# Patient Record
Sex: MALE | Race: WHITE
[De-identification: names, ages, dates, MRNs, and addresses within clinical notes are randomized per-mention and may not be internally consistent; named-entity substitution may affect disease eponyms.]

---

## 2018-02-14 ENCOUNTER — HOSPITAL ENCOUNTER (EMERGENCY)
Dept: HOSPITAL 17 - NEPC | Age: 58
Discharge: HOME | End: 2018-02-14
Payer: SELF-PAY

## 2018-02-14 VITALS
RESPIRATION RATE: 16 BRPM | HEART RATE: 71 BPM | SYSTOLIC BLOOD PRESSURE: 118 MMHG | DIASTOLIC BLOOD PRESSURE: 66 MMHG | OXYGEN SATURATION: 97 %

## 2018-02-14 VITALS
HEART RATE: 86 BPM | RESPIRATION RATE: 20 BRPM | DIASTOLIC BLOOD PRESSURE: 80 MMHG | OXYGEN SATURATION: 98 % | SYSTOLIC BLOOD PRESSURE: 113 MMHG

## 2018-02-14 VITALS — HEIGHT: 75 IN | BODY MASS INDEX: 37.83 KG/M2 | WEIGHT: 304.24 LBS

## 2018-02-14 VITALS — TEMPERATURE: 97.6 F

## 2018-02-14 DIAGNOSIS — I10: ICD-10-CM
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
## 2018-02-14 DIAGNOSIS — K21.9: ICD-10-CM

## 2018-02-14 DIAGNOSIS — E86.0: ICD-10-CM

## 2018-02-14 DIAGNOSIS — R94.31: ICD-10-CM

## 2018-02-14 DIAGNOSIS — F10.129: ICD-10-CM

## 2018-02-14 DIAGNOSIS — R55: Primary | ICD-10-CM

## 2018-02-14 LAB
ALBUMIN SERPL-MCNC: 3.7 GM/DL (ref 3.4–5)
ALP SERPL-CCNC: 92 U/L (ref 45–117)
ALT SERPL-CCNC: 31 U/L (ref 12–78)
AST SERPL-CCNC: 43 U/L (ref 15–37)
BASOPHILS # BLD AUTO: 0.1 TH/MM3 (ref 0–0.2)
BASOPHILS NFR BLD: 1.1 % (ref 0–2)
BILIRUB SERPL-MCNC: 0.3 MG/DL (ref 0.2–1)
BUN SERPL-MCNC: 31 MG/DL (ref 7–18)
CALCIUM SERPL-MCNC: 8.2 MG/DL (ref 8.5–10.1)
CHLORIDE SERPL-SCNC: 99 MEQ/L (ref 98–107)
COLOR UR: YELLOW
CREAT SERPL-MCNC: 1.72 MG/DL (ref 0.6–1.3)
EOSINOPHIL # BLD: 0.1 TH/MM3 (ref 0–0.4)
EOSINOPHIL NFR BLD: 1 % (ref 0–4)
ERYTHROCYTE [DISTWIDTH] IN BLOOD BY AUTOMATED COUNT: 13.4 % (ref 11.6–17.2)
GFR SERPLBLD BASED ON 1.73 SQ M-ARVRAT: 41 ML/MIN (ref 89–?)
GLUCOSE SERPL-MCNC: 143 MG/DL (ref 74–106)
GLUCOSE UR STRIP-MCNC: (no result) MG/DL
HCO3 BLD-SCNC: 27.1 MEQ/L (ref 21–32)
HCT VFR BLD CALC: 38.5 % (ref 39–51)
HGB BLD-MCNC: 12.9 GM/DL (ref 13–17)
HGB UR QL STRIP: (no result)
HYALINE CASTS #/AREA URNS LPF: 11 /LPF
INR PPP: 1 RATIO
KETONES UR STRIP-MCNC: (no result) MG/DL
LYMPHOCYTES # BLD AUTO: 2.1 TH/MM3 (ref 1–4.8)
LYMPHOCYTES NFR BLD AUTO: 30.7 % (ref 9–44)
MCH RBC QN AUTO: 32.5 PG (ref 27–34)
MCHC RBC AUTO-ENTMCNC: 33.6 % (ref 32–36)
MCV RBC AUTO: 96.5 FL (ref 80–100)
MONOCYTE #: 0.9 TH/MM3 (ref 0–0.9)
MONOCYTES NFR BLD: 13 % (ref 0–8)
MUCOUS THREADS #/AREA URNS LPF: (no result) /LPF
NEUTROPHILS # BLD AUTO: 3.7 TH/MM3 (ref 1.8–7.7)
NEUTROPHILS NFR BLD AUTO: 54.2 % (ref 16–70)
NITRITE UR QL STRIP: (no result)
PLATELET # BLD: 235 TH/MM3 (ref 150–450)
PMV BLD AUTO: 8.3 FL (ref 7–11)
PROT SERPL-MCNC: 7.5 GM/DL (ref 6.4–8.2)
PROTHROMBIN TIME: 10.3 SEC (ref 9.8–11.6)
RBC # BLD AUTO: 3.99 MIL/MM3 (ref 4.5–5.9)
SODIUM SERPL-SCNC: 138 MEQ/L (ref 136–145)
SP GR UR STRIP: 1.02 (ref 1–1.03)
SQUAMOUS #/AREA URNS HPF: <1 /HPF (ref 0–5)
TROPONIN I SERPL-MCNC: (no result) NG/ML (ref 0.02–0.05)
URINE LEUKOCYTE ESTERASE: (no result)
WBC # BLD AUTO: 6.8 TH/MM3 (ref 4–11)

## 2018-02-14 PROCEDURE — 70450 CT HEAD/BRAIN W/O DYE: CPT

## 2018-02-14 PROCEDURE — 83880 ASSAY OF NATRIURETIC PEPTIDE: CPT

## 2018-02-14 PROCEDURE — 80307 DRUG TEST PRSMV CHEM ANLYZR: CPT

## 2018-02-14 PROCEDURE — 85730 THROMBOPLASTIN TIME PARTIAL: CPT

## 2018-02-14 PROCEDURE — 93005 ELECTROCARDIOGRAM TRACING: CPT

## 2018-02-14 PROCEDURE — 85025 COMPLETE CBC W/AUTO DIFF WBC: CPT

## 2018-02-14 PROCEDURE — 80053 COMPREHEN METABOLIC PANEL: CPT

## 2018-02-14 PROCEDURE — 96360 HYDRATION IV INFUSION INIT: CPT

## 2018-02-14 PROCEDURE — 84484 ASSAY OF TROPONIN QUANT: CPT

## 2018-02-14 PROCEDURE — 81001 URINALYSIS AUTO W/SCOPE: CPT

## 2018-02-14 PROCEDURE — 85610 PROTHROMBIN TIME: CPT

## 2018-02-14 PROCEDURE — 96361 HYDRATE IV INFUSION ADD-ON: CPT

## 2018-02-14 PROCEDURE — 99285 EMERGENCY DEPT VISIT HI MDM: CPT

## 2018-02-14 NOTE — PD
HPI


Chief Complaint:  Syncope/Near-Syncope


Time Seen by Provider:  19:43


Travel History


International Travel<30 days:  No


Contact w/Intl Traveler<30days:  No


Traveled to known affect area:  No





History of Present Illness


HPI


[place] [employer] [place] , apparently patient "passed out" for 5 

seconds, witnessed by family, no seizure activity and no postictal.  Patient 

does admit to having a few drinks while he was at the bar with family.  Denies 

any alleviating or aggravating factors.  Denies any associated factors such as 

fever headache photophobia nausea vomiting diarrhea or rash chest pain 

abdominal pain back pain.





PFSH


Past Medical History


Blood Disorders:  No


GERD:  Yes


Hypertension:  Yes


Tetanus Vaccination:  Unknown


Influenza Vaccination:  No





Past Surgical History


Thoracic Surgery:  Yes (Tumor left lung)





Social History


Alcohol Use:  Yes (Social)


Tobacco Use:  No


Substance Use:  No





Allergies-Medications


(Allergen,Severity, Reaction):  


Coded Allergies:  


     No Known Allergies (Verified  Allergy, Severe, 2/14/18)


 NKA


Uncoded Allergies:  


     nka (Allergy, Unknown, 9/9/03)


Reported Meds & Prescriptions





Reported Meds & Active Scripts


Active


Reported


Chlorthalidone 25 Mg Tab 25 Mg PO DAILY


Montelukast (Montelukast Sodium) 10 Mg Tab 10 Mg PO HS


Metoprolol Succinate ER 24 HR (Metoprolol Succinate) 50 Mg Tab 50 Mg PO DAILY


Advair Hfa 12 GM Inh (Fluticasone-Salmeterol 12 GM Inh) 230-21 Mcg/Act Aer 2 

Puff INH BID


Losartan-Hydrochlorothiazide 100-25 Mg Tab 1 Tab PO DAILY


Omeprazole 40 Mg Cap 40 Mg PO BID


Folic Acid 0.8 Mg Tab 800 Mcg PO DAILY


Vitamin B-1 (Thiamine Mononitrate) 100 Mg Tab 100 Mg PO DAILY


Vitamin C (Ascorbic Acid) 250 Mg Tab 500 Mg PO DAILY


Centrum Silver Adult 50+ (Multiple Vitamins W/ Minerals) 0.4 Mg-300 Mcg-250 Mcg 

Tab 1 Tab PO DAILY


Amlodipine (Amlodipine Besylate) 5 Mg Tab 5 Mg PO DAILY








Review of Systems


Except as stated in HPI:  all other systems reviewed are Neg


General / Constitutional:  No: Fever


Eyes:  No: Visual changes


HENT:  No: Headaches


Cardiovascular:  No: Chest Pain or Discomfort


Respiratory:  No: Shortness of Breath


Gastrointestinal:  No: Abdominal Pain


Genitourinary:  No: Dysuria


Musculoskeletal:  No: Pain


Skin:  No Rash


Neurologic:  Positive: Syncope


Psychiatric:  No: Depression


Endocrine:  No: Polydipsia


Hematologic/Lymphatic:  No: Easy Bruising





Physical Exam


Narrative


GENERAL: pleasant elderly  male, smells of etoh on breath


SKIN: Warm and dry.


HEAD: Atraumatic. Normocephalic. 


EYES: Pupils equal and round. No scleral icterus. No injection or drainage. 


ENT: No nasal bleeding or discharge.  Mucous membranes pink and moist.


NECK: Trachea midline. No JVD. 


CARDIOVASCULAR: Regular rate and rhythm.  


RESPIRATORY: No accessory muscle use. Clear to auscultation. Breath sounds 

equal bilaterally. 


GASTROINTESTINAL: Abdomen soft, non-tender, nondistended. 


MUSCULOSKELETAL: Extremities without clubbing, cyanosis, or edema. No obvious 

deformities. 


NEUROLOGICAL: Awake and alert. No obvious cranial nerve deficits.  Motor 

grossly within normal limits. Five out of 5 muscle strength in the arms and 

legs.  Normal speech.


PSYCHIATRIC: Appropriate mood and affect; insight and judgment normal.





Data


Data


Last Documented VS





Vital Signs








  Date Time  Temp Pulse Resp B/P (MAP) Pulse Ox O2 Delivery O2 Flow Rate FiO2


 


2/14/18 19:38 97.6       


 


2/14/18 19:35  71 16 118/66 (83) 97   








Orders





 Orders


Electrocardiogram (2/14/18 19:43)


Complete Blood Count With Diff (2/14/18 19:43)


Comprehensive Metabolic Panel (2/14/18 19:43)


B-Type Natriuretic Peptide (2/14/18 19:43)


Troponin I (2/14/18 19:43)


Act Partial Throm Time (Ptt) (2/14/18 19:43)


Prothrombin Time / Inr (Pt) (2/14/18 19:43)


Urinalysis - C+S If Indicated (2/14/18 19:43)


Ecg Monitoring (2/14/18 19:43)


Iv Access Insert/Monitor (2/14/18 19:43)


Oximetry (2/14/18 19:43)


Sodium Chloride 0.9% Flush (Ns Flush) (2/14/18 19:45)


Drug Screen, Random Urine (2/14/18 19:43)


Alcohol (Ethanol) (2/14/18 19:43)


Ct Brain W/O Iv Contrast(Rout) (2/14/18 19:43)


Sodium Chlor 0.9% 1000 Ml Inj (Ns 1000 M (2/14/18 20:00)





Labs





Laboratory Tests








Test


  2/14/18


19:50 2/14/18


20:15


 


White Blood Count 6.8 TH/MM3  


 


Red Blood Count 3.99 MIL/MM3  


 


Hemoglobin 12.9 GM/DL  


 


Hematocrit 38.5 %  


 


Mean Corpuscular Volume 96.5 FL  


 


Mean Corpuscular Hemoglobin 32.5 PG  


 


Mean Corpuscular Hemoglobin


Concent 33.6 % 


  


 


 


Red Cell Distribution Width 13.4 %  


 


Platelet Count 235 TH/MM3  


 


Mean Platelet Volume 8.3 FL  


 


Neutrophils (%) (Auto) 54.2 %  


 


Lymphocytes (%) (Auto) 30.7 %  


 


Monocytes (%) (Auto) 13.0 %  


 


Eosinophils (%) (Auto) 1.0 %  


 


Basophils (%) (Auto) 1.1 %  


 


Neutrophils # (Auto) 3.7 TH/MM3  


 


Lymphocytes # (Auto) 2.1 TH/MM3  


 


Monocytes # (Auto) 0.9 TH/MM3  


 


Eosinophils # (Auto) 0.1 TH/MM3  


 


Basophils # (Auto) 0.1 TH/MM3  


 


CBC Comment DIFF FINAL  


 


Differential Comment   


 


Prothrombin Time 10.3 SEC  


 


Prothromb Time International


Ratio 1.0 RATIO 


  


 


 


Activated Partial


Thromboplast Time 19.8 SEC 


  


 


 


Blood Urea Nitrogen 31 MG/DL  


 


Creatinine 1.72 MG/DL  


 


Random Glucose 143 MG/DL  


 


Total Protein 7.5 GM/DL  


 


Albumin 3.7 GM/DL  


 


Calcium Level 8.2 MG/DL  


 


Alkaline Phosphatase 92 U/L  


 


Aspartate Amino Transf


(AST/SGOT) 43 U/L 


  


 


 


Alanine Aminotransferase


(ALT/SGPT) 31 U/L 


  


 


 


Total Bilirubin 0.3 MG/DL  


 


Sodium Level 138 MEQ/L  


 


Potassium Level 4.0 MEQ/L  


 


Chloride Level 99 MEQ/L  


 


Carbon Dioxide Level 27.1 MEQ/L  


 


Anion Gap 12 MEQ/L  


 


Estimat Glomerular Filtration


Rate 41 ML/MIN 


  


 


 


Troponin I


  LESS THAN 0.02


NG/ML 


 


 


B-Type Natriuretic Peptide 31 PG/ML  


 


Ethyl Alcohol Level 311 MG/DL  


 


Urine Color  YELLOW 


 


Urine Turbidity  CLEAR 


 


Urine pH  6.0 


 


Urine Specific Gravity  1.016 


 


Urine Protein  30 mg/dL 


 


Urine Glucose (UA)  NEG mg/dL 


 


Urine Ketones  NEG mg/dL 


 


Urine Occult Blood  NEG 


 


Urine Nitrite  NEG 


 


Urine Bilirubin  NEG 


 


Urine Urobilinogen


  


  LESS THAN 2.0


MG/DL


 


Urine Leukocyte Esterase  NEG 


 


Urine RBC  1 /hpf 


 


Urine WBC  2 /hpf 


 


Urine Squamous Epithelial


Cells 


  <1 /hpf 


 


 


Urine Hyaline Casts  11 /lpf 


 


Urine Mucus  FEW /lpf 


 


Microscopic Urinalysis Comment


  


  CULT NOT


INDICATED


 


Urine Opiates Screen  NEG 


 


Urine Barbiturates Screen  NEG 


 


Urine Amphetamines Screen  NEG 


 


Urine Benzodiazepines Screen  NEG 


 


Urine Cocaine Screen  NEG 


 


Urine Cannabinoids Screen  NEG 











MDM


Medical Decision Making


Medical Screen Exam Complete:  Yes


Emergency Medical Condition:  Yes


Medical Record Reviewed:  Yes


Interpretation(s)


nsr , 72, baseline motion artifact, no stemi pattern


Differential Diagnosis


Alcohol intoxication versus vasovagal syncope versus anemia versus electrolyte 

abnormalities versus intracranial hemorrhage


Narrative Course


Patient's CBC shows no leukocytosis no anemia and no thrombocytosis or 

thrombocytopenia.  Regulation profile is within normal limits urinalysis is 

negative for any evidence of UTI.  Toxicology is negative for tox screen 

however the alcohol level was 311.  Complete metabolic profile does show a 

small amount of prerenal azotemia with BUN of 31 and a creatinine of 1.72 and 

GFR 41.  Patient was given 1 L of normal saline by EMS plus another additional 

liter by me here in the emergency department.  First set of cardiac enzymes 

negative, beta natruretic peptide negative, lipase and albumin within normal 

limits..... Patient has been discharged to family, advised to hydrate with 

water or electrolyte drinks, and to avoid alcohol for the immediate time.





Diagnosis





 Primary Impression:  


 vasovagal syncope


 Additional Impressions:  


 DEHYDRATION


 Alcohol intoxication


 Qualified Codes:  F10.920 - Alcohol use, unspecified with intoxication, 

uncomplicated


Patient Instructions:  Alcohol Intoxication (DC), Dehydration (ED), General 

Instructions


Disposition:  01 DISCHARGE HOME


Condition:  Stable











Tim Salazar MD Feb 14, 2018 20:00

## 2018-02-14 NOTE — RADRPT
EXAM DATE/TIME:  02/14/2018 20:02 

 

[facility] COMPARISON:     

No previous studies available for comparison.

 

 

INDICATIONS :     

Syncope episode.

                      

 

RADIATION DOSE:     

44.91 CTDIvol (mGy) 

 

 

 

MEDICAL HISTORY :     

Hypertension.  tumor left lung

 

SURGICAL HISTORY :      

None. 

 

ENCOUNTER:      

Initial

 

ACUITY:      

1 day

 

PAIN SCALE:      

0/10

 

LOCATION:        

cranial 

 

TECHNIQUE:     

Multiple contiguous axial images were obtained of the head.  Using automated exposure control and adj
ustment of the mA and/or kV according to patient size, radiation dose was kept as low as reasonably a
chievable to obtain optimal diagnostic quality images.   DICOM format image data is available electro
nically for review and comparison.  

 

FINDINGS:     

There is no evidence for intracranial hemorrhage, mass effect, mass lesions, edema, or extra-axial fl
uid collections.  The visualized bony structures appear intact.  The ventricles are normal size for t
he patient's age.  There are no signs of acute infarction for technique. 

 

CONCLUSION:          Unremarkable study.

 

 

 

 MARIFER Hansen MD on February 14, 2018 at 20:16           

Board Certified Radiologist.

 This report was verified electronically.

## 2018-02-15 NOTE — EKG
Date Performed: 02/14/2018       Time Performed: 19:41:08

 

PTAGE:      57 years

 

EKG:      Sinus rhythm 

 

 INTRAVENTRICULAR CONDUCTION DELAY ABNORMAL ECG

 

PREVIOUS TRACING       : 02/19/2003 21.02 Since the prior tracing, there has been no significant greenfield


 

DOCTOR:   Yomi Montgomery  Interpretating Date/Time  02/15/2018 14:33:52